# Patient Record
Sex: MALE | Race: WHITE
[De-identification: names, ages, dates, MRNs, and addresses within clinical notes are randomized per-mention and may not be internally consistent; named-entity substitution may affect disease eponyms.]

---

## 2015-12-02 VITALS
SYSTOLIC BLOOD PRESSURE: 188 MMHG | DIASTOLIC BLOOD PRESSURE: 91 MMHG | DIASTOLIC BLOOD PRESSURE: 91 MMHG | SYSTOLIC BLOOD PRESSURE: 188 MMHG

## 2018-08-31 ENCOUNTER — HOSPITAL ENCOUNTER (OUTPATIENT)
Dept: HOSPITAL 63 - SURG | Age: 58
Discharge: HOME | End: 2018-08-31
Attending: ANESTHESIOLOGY
Payer: OTHER GOVERNMENT

## 2018-08-31 DIAGNOSIS — M47.892: Primary | ICD-10-CM

## 2018-08-31 PROCEDURE — 99214 OFFICE O/P EST MOD 30 MIN: CPT

## 2018-10-03 ENCOUNTER — HOSPITAL ENCOUNTER (OUTPATIENT)
Dept: HOSPITAL 61 - KCIC MRI | Age: 58
Discharge: HOME | End: 2018-10-03
Payer: OTHER GOVERNMENT

## 2018-10-03 DIAGNOSIS — M50.31: Primary | ICD-10-CM

## 2018-10-03 DIAGNOSIS — M43.12: ICD-10-CM

## 2018-10-03 DIAGNOSIS — M48.02: ICD-10-CM

## 2018-10-03 PROCEDURE — 72141 MRI NECK SPINE W/O DYE: CPT

## 2018-10-03 NOTE — [facility]
MRI Cervical Spine Without Contrast

 

History: Cervical spondylosis, chronic neck pain, upper back pain

 

Technique: Multiplanar, multi sequential noncontrast MR imaging was 

performed of the cervical spine.

 

Comparison: None

 

Findings: There is motion degradation even for repeated images. Motion 

degradation limits accurate evaluation of the neural foramina. Cervical 

vertebral body stature is overall maintained. Cervical cord caliber is 

within normal limits without expansile signal change, limited evaluation 

for subtle signal change due to motion artifact. There is no convincing 

marrow edema. There is minimal grade 1 anterior spondylolisthesis at C3-4,

C4-5, C6-7. There is mild degenerative disc disease C3-4, C4-5, C6-7, mild

disc desiccation C5-C6. There is no significant abnormality of the 

cervical medullary junction. There is hemangioma of the T3 vertebral body.

There is mild levoscoliosis.

 

C2-C3:  Spinal canal and neural foramina are adequate.

 

C3-C4:  There is bilateral facet degenerative change. Spinal canal is 

adequate. There is probable uncovertebral degenerative change change. 

There is likely mild-to-moderate left and mild right neural foramina 

compromise. 

 

C4-C5:  There is bilateral facet degenerative change. Spinal canal is 

overall adequate. Neural foramina are not significantly narrowed.

 

C5-C6:  Spinal canal is adequate. There is right greater than left facet 

hypertrophic change. Left neural foramen is likely adequate, probable mild

narrowing on the right.

 

C6-C7:   There is minimal buckling of the ligamentum flavum. Central canal

is overall adequate. There is facet degenerative change. There is probable

minimal narrowing of the left neural foramen, right neural foramen not 

significantly narrowed.

 

C7-T1:  Spinal canal and neural foramina are adequate.

 

Impression: 

1.  There is no significant cervical spinal stenosis. There is multilevel 

facet degenerative change. Accurate evaluation of the neural foramina is 

limited due to motion degradation, probable mild-to-moderate narrowing on 

the left at C3-4 and minimal narrowing on the left at C6-7 and on the 

right at C3-4 and C5-6. There is mild grade 1 anterior spondylolisthesis 

C3-4, C4-5, C6-7. There is mild degenerative disc disease at the same 

levels.

 

Electronically signed by: Yuri Messina MD (10/3/2018 3:05 PM) 

[facility]

## 2018-10-26 ENCOUNTER — HOSPITAL ENCOUNTER (OUTPATIENT)
Dept: HOSPITAL 63 - SURG | Age: 58
Discharge: HOME | End: 2018-10-26
Attending: ANESTHESIOLOGY
Payer: OTHER GOVERNMENT

## 2018-10-26 DIAGNOSIS — Z98.890: ICD-10-CM

## 2018-10-26 DIAGNOSIS — M47.812: Primary | ICD-10-CM

## 2018-10-26 DIAGNOSIS — I10: ICD-10-CM

## 2018-10-26 DIAGNOSIS — Z79.899: ICD-10-CM

## 2018-10-26 DIAGNOSIS — Z90.49: ICD-10-CM

## 2018-10-26 DIAGNOSIS — Z87.01: ICD-10-CM

## 2018-10-26 DIAGNOSIS — G47.30: ICD-10-CM

## 2018-10-26 DIAGNOSIS — M19.90: ICD-10-CM

## 2018-10-26 PROCEDURE — 64492 INJ PARAVERT F JNT C/T 3 LEV: CPT

## 2018-10-26 PROCEDURE — 64491 INJ PARAVERT F JNT C/T 2 LEV: CPT

## 2018-10-26 PROCEDURE — 64490 INJ PARAVERT F JNT C/T 1 LEV: CPT

## 2021-04-21 ENCOUNTER — HOSPITAL ENCOUNTER (OUTPATIENT)
Dept: HOSPITAL 63 - SURG | Age: 61
Discharge: HOME | End: 2021-04-21
Attending: ANESTHESIOLOGY
Payer: OTHER GOVERNMENT

## 2021-04-21 VITALS — SYSTOLIC BLOOD PRESSURE: 155 MMHG | DIASTOLIC BLOOD PRESSURE: 92 MMHG

## 2021-04-21 DIAGNOSIS — G47.30: ICD-10-CM

## 2021-04-21 DIAGNOSIS — M12.88: ICD-10-CM

## 2021-04-21 DIAGNOSIS — I10: ICD-10-CM

## 2021-04-21 DIAGNOSIS — Z98.890: ICD-10-CM

## 2021-04-21 DIAGNOSIS — M47.814: Primary | ICD-10-CM

## 2021-04-21 DIAGNOSIS — M79.10: ICD-10-CM

## 2021-04-21 DIAGNOSIS — Z87.01: ICD-10-CM

## 2021-04-21 DIAGNOSIS — Z90.49: ICD-10-CM

## 2021-04-21 PROCEDURE — 64491 INJ PARAVERT F JNT C/T 2 LEV: CPT

## 2021-04-21 PROCEDURE — 64490 INJ PARAVERT F JNT C/T 1 LEV: CPT

## 2022-02-03 ENCOUNTER — HOSPITAL ENCOUNTER (OUTPATIENT)
Dept: HOSPITAL 63 - SURG | Age: 62
Discharge: HOME | End: 2022-02-03
Attending: ANESTHESIOLOGY
Payer: OTHER GOVERNMENT

## 2022-02-03 VITALS — DIASTOLIC BLOOD PRESSURE: 81 MMHG | SYSTOLIC BLOOD PRESSURE: 152 MMHG

## 2022-02-03 DIAGNOSIS — Z98.890: ICD-10-CM

## 2022-02-03 DIAGNOSIS — I10: ICD-10-CM

## 2022-02-03 DIAGNOSIS — M79.10: ICD-10-CM

## 2022-02-03 DIAGNOSIS — M47.814: ICD-10-CM

## 2022-02-03 DIAGNOSIS — M17.10: ICD-10-CM

## 2022-02-03 DIAGNOSIS — Z87.01: ICD-10-CM

## 2022-02-03 DIAGNOSIS — G47.30: ICD-10-CM

## 2022-02-03 DIAGNOSIS — Z79.899: ICD-10-CM

## 2022-02-03 DIAGNOSIS — M19.90: ICD-10-CM

## 2022-02-03 DIAGNOSIS — M47.812: Primary | ICD-10-CM

## 2022-02-03 PROCEDURE — 99204 OFFICE O/P NEW MOD 45 MIN: CPT

## 2022-02-03 PROCEDURE — G0463 HOSPITAL OUTPT CLINIC VISIT: HCPCS

## 2022-03-21 ENCOUNTER — HOSPITAL ENCOUNTER (OUTPATIENT)
Dept: HOSPITAL 61 - KCIC MRI | Age: 62
End: 2022-03-21
Attending: NURSE PRACTITIONER
Payer: OTHER GOVERNMENT

## 2022-03-21 DIAGNOSIS — G25.0: ICD-10-CM

## 2022-03-21 DIAGNOSIS — G93.89: Primary | ICD-10-CM

## 2022-03-21 PROCEDURE — 70551 MRI BRAIN STEM W/O DYE: CPT

## 2022-03-21 NOTE — [facility]
INDICATION: Tremor



COMPARISON: None.



TECHNIQUE: Multiplanar, multisequence MRI images obtained through the brain.



FINDINGS: 



No midline shift. 

Basilar cistern patent.

No regions of abnormal restricted diffusion.

Ventricles and sulci are within normal limits in size for the patient's age.

No intracranial hemorrhage or gross mass seen.

There is some prominence of the subdural space frontally which could be from volume loss.

Patchy regions of high T2 signal within the white matter as well as the brainstem.



IMPRESSION: 



*  No MRI evidence of acute infarct or hemorrhage.



*  Scattered foci of high T2 signal within the white matter. Nonspecific but can be seen with chronic
 small vessel ischemic changes.



Electronically signed by: Josef Frederick MD (3/21/2022 2:27 PM) [id number]

## 2022-04-20 ENCOUNTER — HOSPITAL ENCOUNTER (OUTPATIENT)
Dept: HOSPITAL 63 - SURG | Age: 62
Discharge: HOME | End: 2022-04-20
Attending: ANESTHESIOLOGY
Payer: OTHER GOVERNMENT

## 2022-04-20 VITALS — DIASTOLIC BLOOD PRESSURE: 85 MMHG | SYSTOLIC BLOOD PRESSURE: 135 MMHG

## 2022-04-20 DIAGNOSIS — M19.90: ICD-10-CM

## 2022-04-20 DIAGNOSIS — Z98.890: ICD-10-CM

## 2022-04-20 DIAGNOSIS — I10: ICD-10-CM

## 2022-04-20 DIAGNOSIS — M47.812: ICD-10-CM

## 2022-04-20 DIAGNOSIS — M79.10: ICD-10-CM

## 2022-04-20 DIAGNOSIS — M47.814: Primary | ICD-10-CM

## 2022-04-20 DIAGNOSIS — Z90.49: ICD-10-CM

## 2022-04-20 DIAGNOSIS — Z87.01: ICD-10-CM

## 2022-04-20 DIAGNOSIS — M17.10: ICD-10-CM

## 2022-04-20 DIAGNOSIS — Z79.899: ICD-10-CM

## 2022-04-20 DIAGNOSIS — G47.30: ICD-10-CM

## 2022-04-20 PROCEDURE — 64490 INJ PARAVERT F JNT C/T 1 LEV: CPT

## 2022-04-20 PROCEDURE — 64492 INJ PARAVERT F JNT C/T 3 LEV: CPT

## 2022-04-20 PROCEDURE — A4930 STERILE, GLOVES PER PAIR: HCPCS

## 2022-04-20 PROCEDURE — 64491 INJ PARAVERT F JNT C/T 2 LEV: CPT

## 2022-04-20 PROCEDURE — A4209 5+ CC STERILE SYRINGE&NEEDLE: HCPCS

## 2022-04-20 PROCEDURE — A4657 SYRINGE W/WO NEEDLE: HCPCS
